# Patient Record
Sex: FEMALE | Race: WHITE | NOT HISPANIC OR LATINO | Employment: PART TIME | URBAN - METROPOLITAN AREA
[De-identification: names, ages, dates, MRNs, and addresses within clinical notes are randomized per-mention and may not be internally consistent; named-entity substitution may affect disease eponyms.]

---

## 2017-05-16 ENCOUNTER — OFFICE VISIT (OUTPATIENT)
Dept: OBSTETRICS AND GYNECOLOGY | Facility: CLINIC | Age: 22
End: 2017-05-16

## 2017-05-16 VITALS
WEIGHT: 105.2 LBS | HEART RATE: 85 BPM | DIASTOLIC BLOOD PRESSURE: 79 MMHG | HEIGHT: 61 IN | BODY MASS INDEX: 19.86 KG/M2 | SYSTOLIC BLOOD PRESSURE: 127 MMHG

## 2017-05-16 DIAGNOSIS — N83.201 CYST OF RIGHT OVARY: Primary | ICD-10-CM

## 2017-05-16 PROCEDURE — 99213 OFFICE O/P EST LOW 20 MIN: CPT | Performed by: OBSTETRICS & GYNECOLOGY

## 2017-05-16 RX ORDER — CETIRIZINE HYDROCHLORIDE 10 MG/1
10 TABLET ORAL DAILY
COMMUNITY
End: 2017-11-08 | Stop reason: HOSPADM

## 2017-11-08 ENCOUNTER — INITIAL PRENATAL (OUTPATIENT)
Dept: OBSTETRICS AND GYNECOLOGY | Facility: CLINIC | Age: 22
End: 2017-11-08

## 2017-11-08 VITALS — BODY MASS INDEX: 20.97 KG/M2 | DIASTOLIC BLOOD PRESSURE: 90 MMHG | SYSTOLIC BLOOD PRESSURE: 132 MMHG | WEIGHT: 111 LBS

## 2017-11-08 DIAGNOSIS — Z34.01 PRIMIGRAVIDA IN FIRST TRIMESTER: Primary | ICD-10-CM

## 2017-11-08 PROCEDURE — 0501F PRENATAL FLOW SHEET: CPT | Performed by: OBSTETRICS & GYNECOLOGY

## 2017-11-08 NOTE — PROGRESS NOTES
Cc:  New Obstetrical Visit  Pt states he has irregular cycles, her lmp was 07-13-17, however she only feels like she is about 6 weeks according to her calculations.  Approximately 1 week ago, patient presented to [facility] ED for nausea.  Patient had bedside ultrasound and was told only gestational sac was visible.  Patient was given prescription for Promethazine.  She is only using as needed.  Past history, medications and allergies reviewed.  Physical exam documented in chart.  Prenatal labs ordered and sonogram ordered  A/P:  IUP at 6 weeks  - Do sonogram for viability  - Labs ordered  - Discussed maternal well being.  - Follow up in 2 to 3 weeks.

## 2017-11-09 ENCOUNTER — TELEPHONE (OUTPATIENT)
Dept: OBSTETRICS AND GYNECOLOGY | Facility: CLINIC | Age: 22
End: 2017-11-09

## 2017-11-09 LAB
ABO GROUP BLD: (no result)
BASOPHILS # BLD AUTO: 0 X10E3/UL (ref 0–0.2)
BASOPHILS NFR BLD AUTO: 0 %
BLD GP AB SCN SERPL QL: NEGATIVE
EOSINOPHIL # BLD AUTO: 0 X10E3/UL (ref 0–0.4)
EOSINOPHIL NFR BLD AUTO: 0 %
ERYTHROCYTE [DISTWIDTH] IN BLOOD BY AUTOMATED COUNT: 13.3 % (ref 12.3–15.4)
HBV SURFACE AG SERPL QL IA: NEGATIVE
HCT VFR BLD AUTO: 42.4 % (ref 34–46.6)
HGB BLD-MCNC: 15 G/DL (ref 11.1–15.9)
HIV 1+2 AB+HIV1 P24 AG SERPL QL IA: NON REACTIVE
IMM GRANULOCYTES # BLD: 0 X10E3/UL (ref 0–0.1)
IMM GRANULOCYTES NFR BLD: 0 %
LYMPHOCYTES # BLD AUTO: 1.5 X10E3/UL (ref 0.7–3.1)
LYMPHOCYTES NFR BLD AUTO: 17 %
MCH RBC QN AUTO: 31.3 PG (ref 26.6–33)
MCHC RBC AUTO-ENTMCNC: 35.4 G/DL (ref 31.5–35.7)
MCV RBC AUTO: 89 FL (ref 79–97)
MONOCYTES # BLD AUTO: 0.4 X10E3/UL (ref 0.1–0.9)
MONOCYTES NFR BLD AUTO: 4 %
NEUTROPHILS # BLD AUTO: 6.7 X10E3/UL (ref 1.4–7)
NEUTROPHILS NFR BLD AUTO: 79 %
PLATELET # BLD AUTO: 211 X10E3/UL (ref 150–379)
RBC # BLD AUTO: 4.79 X10E6/UL (ref 3.77–5.28)
RH BLD: POSITIVE
RPR SER QL: NON REACTIVE
RUBV IGG SERPL IA-ACNC: 9.8 INDEX
WBC # BLD AUTO: 8.6 X10E3/UL (ref 3.4–10.8)

## 2017-11-09 RX ORDER — DOXYLAMINE SUCCINATE AND PYRIDOXINE HYDROCHLORIDE, DELAYED RELEASE TABLETS 10 MG/10 MG 10; 10 MG/1; MG/1
1 TABLET, DELAYED RELEASE ORAL 3 TIMES DAILY
Qty: 60 TABLET | Refills: 1 | Status: SHIPPED | OUTPATIENT
Start: 2017-11-09

## 2017-11-09 NOTE — TELEPHONE ENCOUNTER
"Kristi    Let her know that this was called to her pharmacy    [place]        ----- Message from Kristi Reece sent at 11/9/2017  1:09 PM EST -----  Patient is calling stating that the er doctor prescribed her \"Ditlepis\" and you told her to continue taking this. She has ran out and was wondering if you could fill this for her?    Call back #: [phone]    "

## 2017-11-10 LAB
C TRACH RRNA SPEC QL NAA+PROBE: NEGATIVE
N GONORRHOEA RRNA SPEC QL NAA+PROBE: NEGATIVE
T VAGINALIS RRNA SPEC QL NAA+PROBE: NEGATIVE

## 2017-11-11 LAB
BACTERIA UR CULT: NORMAL
BACTERIA UR CULT: NORMAL

## 2017-11-12 LAB
CONV .: NORMAL
CYTOLOGIST CVX/VAG CYTO: NORMAL
CYTOLOGY CVX/VAG DOC THIN PREP: NORMAL
DX ICD CODE: NORMAL
HIV 1 & 2 AB SER-IMP: NORMAL
OTHER STN SPEC: NORMAL
PATH REPORT.FINAL DX SPEC: NORMAL
STAT OF ADQ CVX/VAG CYTO-IMP: NORMAL

## 2017-11-13 ENCOUNTER — PROCEDURE VISIT (OUTPATIENT)
Dept: OBSTETRICS AND GYNECOLOGY | Facility: CLINIC | Age: 22
End: 2017-11-13

## 2017-11-13 ENCOUNTER — TELEPHONE (OUTPATIENT)
Dept: OBSTETRICS AND GYNECOLOGY | Facility: CLINIC | Age: 22
End: 2017-11-13

## 2017-11-13 DIAGNOSIS — Z34.91 UNCERTAIN DATES, ANTEPARTUM, FIRST TRIMESTER: Primary | ICD-10-CM

## 2017-11-13 PROCEDURE — 76817 TRANSVAGINAL US OBSTETRIC: CPT | Performed by: OBSTETRICS & GYNECOLOGY

## 2017-11-13 NOTE — TELEPHONE ENCOUNTER
Marcia    Tell her that the alternative is to buy over the counter Unisom and also B6 vitamin tablets and take both of them together, 3 times per day.      Selvin              PT reports needing RX for doxylamine-pyridoxine (DICLEGIS) 10-10 MG tablet delayed-release EC tablet [[id number]] to be filled but not being able to fill it due to PA not being sent to her insurance.

## 2017-11-14 LAB
CFTR MUT ANL BLD/T: NORMAL
CONV COMMENT: NORMAL

## 2017-11-17 ENCOUNTER — TELEPHONE (OUTPATIENT)
Dept: OBSTETRICS AND GYNECOLOGY | Facility: CLINIC | Age: 22
End: 2017-11-17

## 2017-11-17 NOTE — TELEPHONE ENCOUNTER
----- Message from Julito Montalvo MD sent at 11/14/2017  7:39 PM EST -----  Contact: Patient  LAW    Can you work on this for me in the next day or two?    Thanks    Selvin  ----- Message -----     From: Keli Penn     Sent: 11/14/2017   4:34 PM       To: Julito Montalvo MD    Patient called stating that her insurance will cover the doxylamine-pyridoxine (DICLEGIS) 10-10 MG tablet delayed-release EC tablet. They just need you to call and do a coverage review for the medication.     Coverage review # [phone]    Patient Call back # [phone]

## 2017-11-21 ENCOUNTER — TELEPHONE (OUTPATIENT)
Dept: OBSTETRICS AND GYNECOLOGY | Facility: CLINIC | Age: 22
End: 2017-11-21

## 2017-11-21 NOTE — TELEPHONE ENCOUNTER
"LAW    I called the hotline and started to work on this, but it was confusing.    An alternative is for her to get Unisom and Vitamin B6 over the counter and take one pill of each, three times per day.    Selvin      ----- Message from Keli Penn sent at 11/17/2017 12:50 PM EST -----  Contact: Patient  Patient called stating that she needs a \"coverage review\" for doxylamine-pyridoxine (DICLEGIS) 10-10 MG tablet delayed-release EC tablet so she can pick the prescription up from her pharmacy.     Call back #[phone]    Coverage Review # [phone]    "

## 2017-11-22 NOTE — TELEPHONE ENCOUNTER
Pt aware of the situation trying to obtain the PA, I advised the pt of the following medication to try. Pt voiced understanding.